# Patient Record
Sex: MALE | Race: WHITE
[De-identification: names, ages, dates, MRNs, and addresses within clinical notes are randomized per-mention and may not be internally consistent; named-entity substitution may affect disease eponyms.]

---

## 2018-05-08 ENCOUNTER — HOSPITAL ENCOUNTER (OUTPATIENT)
Dept: HOSPITAL 89 - US | Age: 58
End: 2018-05-08
Attending: FAMILY MEDICINE
Payer: COMMERCIAL

## 2018-05-08 DIAGNOSIS — E04.2: Primary | ICD-10-CM

## 2018-05-08 PROCEDURE — 76536 US EXAM OF HEAD AND NECK: CPT

## 2018-05-08 NOTE — RADIOLOGY IMAGING REPORT
FACILITY: [facility] 

 

PATIENT NAME: Luis Saucedo

: 1960

MR: [id number]

V: [id number]

EXAM DATE: 

ORDERING PHYSICIAN: CAIO DEL TORO

TECHNOLOGIST: 

 

Location: [facility]

Patient: Luis Saucedo

: 1960

MRN: [id number]

Visit/Account:[id number]

Date of Sevice:  2018

 

ACCESSION #: [id number]

 

THYROID

 

HISTORY:  Hypothyroidism

 

COMPARISON:  2013

 

FINDINGS:

 

SIZE: Normal.

Right lobe: 4.3 x 1.6 x 1.3 cm

Left lobe: 3.9 x 0.9 x 1.8 cm

Isthmus: 4 mm

 

PARENCHYMA: Heterogeneous bilaterally

 

NODULES:

Right lobe:

*  None discrete.

Left lobe:

*  There is a 2.5 mm cyst in the lower pole of the left lobe

Isthmus:

*  None discrete.

 

VASCULARITY: Within normal limits.

 

ADDITIONAL FINDINGS: None.

 

IMPRESSION:

Heterogeneous appearing thyroid bilaterally

 

2.5 mm cyst lower pole the left lobe

 

 

REFERENCE:

2015 American Thyroid Association Management Guidelines for Adult Patients with Thyroid Nodules and D
ifferentiated Thyroid Cancer: The American Thyroid Association Guidelines Task Force on Thyroid Nodul
es and Differentiated Thyroid Cancer.

 

SONOGRAPHIC PATTERNS:

*  Benign: Purely cystic nodules (no solid component); estimated risk of malignancy <1 percent; no bi
opsy recommended.

*  Very Low Suspicion: Spongiform or partially cystic nodules without any of the sonographic features
 described in low, intermediate, or high suspicion patterns; estimated risk of malignancy <3 percent;
 consider FNA at > 2 cm (Observation without FNA is also a reasonable option).

*  Low Suspicion: Isoechoic or hyperechoic solid nodule, or partially cystic nodule with eccentric so
lid areas, without microcalcification, irregular margin or ETE (extra-thyroidal extension), or taller
 than wide shape; estimated risk of malignancy 5-10 percent; recommend FNA at >1.5 cm.

*  Intermediate Suspicion: Hypoechoic solid nodule with smooth margins without microcalcifications, E
TE (extra-thyroidal extension), or taller than wide shape; estimated risk of malignancy 10-20 percent
; recommend FNA at > 1 cm.

*  High Suspicion: Solid hypoechoic nodule or solid hypoechoic component of a partially cystic nodule
 with one or more of the following features: irregular margins (infiltrative, microlobulated), microc
alcifications, taller than wide shape, rim calcifications with small extrusive soft tissue component,
 evidence of ETE (extra-thyroidal extension); estimated risk of malignancy >70-90 percent; recommend 
FNA at > 1 cm.

 

NOTES:

*  Although a sonographically suspicious subcentimeter thyroid nodule without evidence of extrathyroi
adan extension or sonographically suspicious lymph nodes may be observed with close sonographic follow
-up rather than pursuing immediate FNA, patient age and preference may modify decision-making.

A > 50% interval increase in nodule volume and/or development of new suspicious sonographic features 
are felt to be a valid reasons for potential re-aspiration of a nodule previously shown to have benig
n FNA cytology.

 

Report Dictated By: Margarette Patel MD at 2018 11:12 AM

 

Report E-Signed By: Margarette Patel MD  at 2018 11:13 AM

 

WSN:[id number]

## 2019-06-23 ENCOUNTER — HOSPITAL ENCOUNTER (EMERGENCY)
Dept: HOSPITAL 89 - ER | Age: 59
Discharge: HOME | End: 2019-06-23
Payer: COMMERCIAL

## 2019-06-23 VITALS — DIASTOLIC BLOOD PRESSURE: 80 MMHG | SYSTOLIC BLOOD PRESSURE: 121 MMHG

## 2019-06-23 DIAGNOSIS — R10.9: Primary | ICD-10-CM

## 2019-06-23 DIAGNOSIS — N28.1: ICD-10-CM

## 2019-06-23 LAB — PLATELET COUNT, AUTOMATED: 210 K/UL (ref 150–450)

## 2019-06-23 PROCEDURE — 84075 ASSAY ALKALINE PHOSPHATASE: CPT

## 2019-06-23 PROCEDURE — 82247 BILIRUBIN TOTAL: CPT

## 2019-06-23 PROCEDURE — 82435 ASSAY OF BLOOD CHLORIDE: CPT

## 2019-06-23 PROCEDURE — 85025 COMPLETE CBC W/AUTO DIFF WBC: CPT

## 2019-06-23 PROCEDURE — 84155 ASSAY OF PROTEIN SERUM: CPT

## 2019-06-23 PROCEDURE — 84295 ASSAY OF SERUM SODIUM: CPT

## 2019-06-23 PROCEDURE — 83690 ASSAY OF LIPASE: CPT

## 2019-06-23 PROCEDURE — 74177 CT ABD & PELVIS W/CONTRAST: CPT

## 2019-06-23 PROCEDURE — 84450 TRANSFERASE (AST) (SGOT): CPT

## 2019-06-23 PROCEDURE — 82374 ASSAY BLOOD CARBON DIOXIDE: CPT

## 2019-06-23 PROCEDURE — 82565 ASSAY OF CREATININE: CPT

## 2019-06-23 PROCEDURE — 82310 ASSAY OF CALCIUM: CPT

## 2019-06-23 PROCEDURE — 81001 URINALYSIS AUTO W/SCOPE: CPT

## 2019-06-23 PROCEDURE — 96375 TX/PRO/DX INJ NEW DRUG ADDON: CPT

## 2019-06-23 PROCEDURE — 96374 THER/PROPH/DIAG INJ IV PUSH: CPT

## 2019-06-23 PROCEDURE — 82040 ASSAY OF SERUM ALBUMIN: CPT

## 2019-06-23 PROCEDURE — 84520 ASSAY OF UREA NITROGEN: CPT

## 2019-06-23 PROCEDURE — 74018 RADEX ABDOMEN 1 VIEW: CPT

## 2019-06-23 PROCEDURE — 96361 HYDRATE IV INFUSION ADD-ON: CPT

## 2019-06-23 PROCEDURE — 84132 ASSAY OF SERUM POTASSIUM: CPT

## 2019-06-23 PROCEDURE — 99284 EMERGENCY DEPT VISIT MOD MDM: CPT

## 2019-06-23 PROCEDURE — 82947 ASSAY GLUCOSE BLOOD QUANT: CPT

## 2019-06-23 PROCEDURE — 84460 ALANINE AMINO (ALT) (SGPT): CPT

## 2019-06-23 NOTE — RADIOLOGY IMAGING REPORT
FACILITY: [facility] [place] 

 

PATIENT NAME: Luis Saucedo

: 1960

MR: [id number]

V: [id number]

EXAM DATE: 

ORDERING PHYSICIAN: AMY RHODES

TECHNOLOGIST: 

 

Location: [facility] [place]

Patient: Luis Saucedo

: 1960

MRN: [id number]

Visit/Account:[id number]

Date of Sevice:  2019

 

ACCESSION #: [id number]

 

CT abdomen and pelvis with IV contrast

 

Indication: Right flank pain.

 

Comparison:   10/14/2014..

 

Technique:   Axial CT images were obtained through the abdomen and pelvis during injection of nonioni
c iodinated intravenous contrast. Reformatted coronal and sagittal images were also obtained.

One of the following dose optimization techniques was utilized in the performance of this exam: Autom
ated exposure control; adjustment of the mA and/or kV according to the patient's size; or use of an i
terative  reconstruction technique.  Specific details can be referenced in the facility's radiology C
T exam operational policy.

Contrast:   75 ml of Isovue-370  IV contrast.

 

Findings:

Lower lung fields: Limited views lower lung field are unremarkable.

 

Liver: No focal parenchymal abnormality of the liver.

Biliary: Gallbladder appears unremarkable as well as the intra and extra hepatic biliary system.

Pancreas: No focal abnormality.

Spleen: Normal appearance.

Adrenal glands: Unremarkable.

Kidneys / retroperitoneum: No evidence of nephrolithiasis or hydronephrosis.  Right renal cysts are a
gain present.  The largest measuring 7 cm, previously 4.5 cm..  Similar to the previous examination. 
 No solid renal lesions.

 

 

Bowel / peritoneum / mesenteries: Colon shows no focal abnormality.  The appendix not visualized.  Th
ere are some surgical sutures in this region which may suggest appendectomy.  Small bowel shows no fo
lizz normality or obstruction.  The stomach is unremarkable.

No free air, free fluid, fluid collections or areas of inflammation.  Small umbilical hernia containi
ng fat.

 

Lymph node assessment: No pathologic adenopathy identified.

 

Pelvic  structures: Prostate is again mildly enlarged and heterogeneous.  Cause mild impression to 
the urinary bladder.  The remaining pelvic structures visualized within normal limits.

 

 

Vessels: No significant atherosclerotic calcifications seen throughout a nonaneurysmal abdominal aort
a and branches.

 

Musculoskeletal / Body wall: No acute or aggressive osseous abnormality.  Spine again shows degenerat
mitch changes.

 

 

 

IMPRESSION:

1. No acute intra-abdominal abnormality

2.  No indication of urinary stones or hydronephrosis.

3.  The right kidney shows renal cysts.  The largest measures 7 cm and previously 4.5 cm.  However st
ill simple cyst.

4.  Other chronic stable findings as above.

 

 

Report Dictated By: Nelson Moore at 2019 8:58 PM

 

Report E-Signed By: Nelson Moore  at 2019 9:08 PM

 

WSN:[id number]

## 2019-06-23 NOTE — RADIOLOGY IMAGING REPORT
FACILITY: [facility] [place] 

 

PATIENT NAME: Luis Saucedo

: 1960

MR: [id number]

V: [id number]

EXAM DATE: 

ORDERING PHYSICIAN: AMY RHODES

TECHNOLOGIST: 

 

Location: [facility]

Patient: Luis Saucedo

: 1960

MRN: [id number]

Visit/Account:[id number]

Date of Sevice:  2019

 

ACCESSION #: [id number]

 

KUB SINGLE VIEW ABDOMEN

 

INDICATION: Right-sided abdominal pain.

 

COMPARISON: None available

 

FINDINGS:   Spine view the abdomen.  Some stool seen in colon.  The bowel gas pattern is nonobstructe
d and nondilated.  Mildly prominent loops small bowel seen in left mid abdomen.  However no dilated l
oops of small bowel seen.  Abdominal soft tissues grossly normal without suspicious lucencies.  The r
ight mid abdomen does show a small calcific density along the psoas muscle and tiny calcific density 
in the right inferior pelvis.  Left pelvis does show a prominent vascular phlebolith.  No other suspi
cious calcifications.  Lung bases are clear.  No acute bony abnormality.

 

IMPRESSION: A small calcific density in the right mid abdomen right lower pelvis as above.  These may
 be benign such as a vascular phlebolith or debris in the alimentary tract.  However cannot exclude a
 urinary calcification.  If there is clinical concern or hematuria.  Follow-up noncontrast CT scan of
 the abdomen and pelvis can assess for urinary calcifications.

 

Report Dictated By: Nelson Moore at 2019 7:08 PM

 

Report E-Signed By: Nelson Moore  at 2019 7:12 PM

 

WSN:[id number]

## 2019-06-23 NOTE — ER REPORT
History and Physical


Time Seen By MD:  17:45


Hx. of Stated Complaint:  


THORACIC BACK PAIN SINCE FRIDAY. REPORTS INCREASED TYLENOL SINCE  (LORTAB, 


AND APPROX 5 500 MG TABLETS DAILY)


HPI/ROS


CHIEF COMPLAINT: Back and side pain





HISTORY OF PRESENT ILLNESS: This is a 59-year-old male who presents to the 


emergency department for right sided back pain. Patient states that he has 


intermittent low back pain, and also last week had a root canal, had some 


hydrocodone that he taken, he states that he was taken one hydrocodone 


2025 he was also taken 500 mg Tylenol tablets more frequently over the 


last couple of days due to the back and side pain. Patient became very nervous 


and anxious as he started reading about Tylenol toxicity and is concerned that 


perhaps he has Tylenol problems or something wrong with his gallbladder. Patient


states that the pain in the side and back began Friday, slow to progress. No 


fevers or chills. No chest pain or shortness of breath. No jaundice. No 


headaches or confusion.





REVIEW OF SYSTEMS:


Constitutional: No fever, no chills.


Eyes: No discharge.


ENT: No sore throat. 


Cardiovascular: No chest pain, no palpitations.


Respiratory: No cough, no shortness of breath.


Gastrointestinal: As above.


Genitourinary: No hematuria.


Musculoskeletal: As above.


Skin: No rashes.


Neurological: No headache.


Allergies:  


Coded Allergies:  


     No Known Drug Allergies (Verified , 14)


Home Meds


Active Scripts


Hydrocodone Bit/Acetaminophen (NORCO 5-325 TABLET) 1 Each Tablet, 1 EACH PO Q4-


6H PRN for PAIN, #10 TAB


   Prov:AMY RHODES FN-BC         19


Reported Medications


Levothyroxine Sodium (LEVOTHYROXINE SODIUM) 75 Mcg Tablet, 75 MCG PO QDAY, TAB


   19


Dexlansoprazole (DEXILANT) 30 Mg Cap.dr.mp, 30 MG PO DAILY


   19


Bupropion Hcl (WELLBUTRIN XL) 300 Mg Tab.er.24h, 300 MG PO QDAY, TAB


   TAKE 1 TABLET BY MOUTH EVERY DAY


   14


Discontinued Reported Medications


Aspirin (ASPIR 81) 81 Mg Tablet., 81 MG PO QDAY, TAB


   14


Famotidine (Pepcid Ac) 20 Mg Tablet, 20 MG PO PRN, 0 Refills


   12/17/10


Discontinued Scripts


Cyclobenzaprine Hcl (CYCLOBENZAPRINE HCL) 10 Mg Tablet, 10 MG PO TID, #20 TAB


   TAKE 1 TABLET BY MOUTH THREE TIMES A DAY


   Prov:SHUKRI VELASQUEZ DO         14


Hydrocodone Bit/Acetaminophen (HYDROCODON-ACETAMINOPHEN 5-325) 1 Each Tablet, 1 


EACH PO Q4-6H, #10


   TAKE ONE TABLET BY MOUTH


   EVERY 4-6 HOURS AS NEEDED


   FOR PAIN


   Prov:SHUKRI VELASQUEZ DO         14


Past Medical/Surgical History


The patient has a past medical and surgical history of right bundle branch 


block, bronchitis, pneumonia, GERD, wears glasses, vascular surgery, surface 


blood clot, depression, anxiety, appendectomy, ankle tendon repair.


Reviewed Nurses Notes:  Yes


Hx Smoking:  No


Smoking Status:  Never Smoker


Hx Substance Use Disorder:  No


Hx Alcohol Use:  No


Constitutional





Vital Sign - Last 24 Hours








 19





 17:15 17:30 18:00 18:30


 


Temp 97.2   


 


Pulse 74 68 74 71


 


Resp 20   


 


B/P (MAP) 151/93   131/87 (102)


 


Pulse Ox 95 92 93 92


 


O2 Delivery Room Air   


 


    





 19   





 19:00   


 


Pulse 64   


 


B/P (MAP) 118/83 (95)   


 


Pulse Ox 92   








Physical Exam


   General Appearance: The patient is alert, has no immediate need for airway 


protection and no signs of toxicity. 


Eyes: Pupils equal and round no pallor or injection. No icterus.


ENT, Mouth: Mucous membranes are moist.


Respiratory: There are no retractions, lungs are clear to auscultation.


Cardiovascular: Regular rate and rhythm. No murmurs, clicks or rubs.


Gastrointestinal:  Abdomen is soft and non tender, no masses, bowel sounds 


normal. Negative Manning sign.


Neurological: Alert and oriented sore. Moving. Following all commands. No focal 


neuro deficits.


Skin: Warm and dry, no rashes.


Musculoskeletal:  Neck is supple non tender.


      Extremities are nontender, nonswollen and have full range of motion.





DIFFERENTIAL DIAGNOSIS: After history and physical exam differential diagnosis 


was considered for back pain including but not limited to muscular pain, 


herniated disc, spine fracture, intra-abdominal causes and urinary tract 


infection.





Medical Decision Making


Data Points


Result Diagram:  


19 1720                                                                    


           6/23/19 1720





Laboratory





Hematology








Test


 19


17:09 19


17:20


 


Urine Color Colorless  


 


Urine Clarity Clear  


 


Urine pH


 8.0 pH


(4.8-9.5) 





 


Urine Specific Gravity 1.002  


 


Urine Protein


 Negative mg/dL


(NEGATIVE) 





 


Urine Glucose (UA)


 Negative mg/dL


(NEGATIVE) 





 


Urine Ketones


 Negative mg/dL


(NEGATIVE) 





 


Urine Blood


 Negative


(NEGATIVE) 





 


Urine Nitrite


 Negative


(NEGATIVE) 





 


Urine Bilirubin


 Negative


(NEGATIVE) 





 


Urine Urobilinogen


 Negative mg/dL


(0.2-1.9) 





 


Urine Leukocyte Esterase


 Negative


(NEGATIVE) 





 


Urine RBC


 None /HPF


(0-2/HPF) 





 


Urine WBC


 <1 /HPF


(0-5/HPF) 





 


Urine Squamous Epithelial


Cells None /LPF


(</=FEW) 





 


Urine Bacteria


 Negative /HPF


(NONE-FEW) 





 


Urine Mucus


 None /HPF


(NONE-FEW) 





 


Red Blood Count


 


 5.24 M/uL


(4.00-5.60)


 


Mean Corpuscular Volume


 


 92.4 fL


(80.0-96.0)


 


Mean Corpuscular Hemoglobin


 


 31.0 pg


(26.0-33.0)


 


Mean Corpuscular Hemoglobin


Concent 


 33.5 g/dL


(32.0-36.0)


 


Red Cell Distribution Width


 


 13.8 %


(11.5-14.5)


 


Mean Platelet Volume


 


 8.1 fL


(7.2-11.1)


 


Neutrophils (%) (Auto)


 


 73.7 %


(39.4-72.5)


 


Lymphocytes (%) (Auto)


 


 17.6 %


(17.6-49.6)


 


Monocytes (%) (Auto)


 


 7.1 %


(4.1-12.4)


 


Eosinophils (%) (Auto)


 


 0.6 %


(0.4-6.7)


 


Basophils (%) (Auto)


 


 1.0 %


(0.3-1.4)


 


Nucleated RBC Relative Count


(auto) 


 0.1 /100WBC 





 


Neutrophils # (Auto)


 


 4.2 K/uL


(2.0-7.4)


 


Lymphocytes # (Auto)


 


 1.0 K/uL


(1.3-3.6)


 


Monocytes # (Auto)


 


 0.4 K/uL


(0.3-1.0)


 


Eosinophils # (Auto)


 


 0.0 K/uL


(0.0-0.5)


 


Basophils # (Auto)


 


 0.1 K/uL


(0.0-0.1)


 


Nucleated RBC Absolute Count


(auto) 


 0.01 K/uL 





 


Sodium Level


 


 141 mmol/L


(137-145)


 


Potassium Level


 


 3.7 mmol/L


(3.5-5.0)


 


Chloride Level


 


 102 mmol/L


()


 


Carbon Dioxide Level


 


 28 mmol/L


(22-30)


 


Blood Urea Nitrogen


 


 12 mg/dl


(9-21)


 


Creatinine


 


 1.20 mg/dl


(0.66-1.25)


 


Glomerular Filtration Rate


Calc 


 > 60.0 





 


Random Glucose


 


 104 mg/dl


()


 


Calcium Level


 


 8.8 mg/dl


(8.4-10.2)


 


Total Bilirubin


 


 0.4 mg/dl


(0.2-1.3)


 


Aspartate Amino Transf


(AST/SGOT) 


 25 U/L (0-35) 





 


Alanine Aminotransferase


(ALT/SGPT) 


 36 U/L (0-56) 





 


Alkaline Phosphatase  70 U/L (0-126) 


 


Total Protein


 


 7.1 g/dl


(6.3-8.2)


 


Albumin


 


 4.3 g/dl


(3.5-5.0)


 


Lipase


 


 48 U/L


()








Chemistry








Test


 19


17:09 19


17:20


 


Urine Color Colorless  


 


Urine Clarity Clear  


 


Urine pH


 8.0 pH


(4.8-9.5) 





 


Urine Specific Gravity 1.002  


 


Urine Protein


 Negative mg/dL


(NEGATIVE) 





 


Urine Glucose (UA)


 Negative mg/dL


(NEGATIVE) 





 


Urine Ketones


 Negative mg/dL


(NEGATIVE) 





 


Urine Blood


 Negative


(NEGATIVE) 





 


Urine Nitrite


 Negative


(NEGATIVE) 





 


Urine Bilirubin


 Negative


(NEGATIVE) 





 


Urine Urobilinogen


 Negative mg/dL


(0.2-1.9) 





 


Urine Leukocyte Esterase


 Negative


(NEGATIVE) 





 


Urine RBC


 None /HPF


(0-2/HPF) 





 


Urine WBC


 <1 /HPF


(0-5/HPF) 





 


Urine Squamous Epithelial


Cells None /LPF


(</=FEW) 





 


Urine Bacteria


 Negative /HPF


(NONE-FEW) 





 


Urine Mucus


 None /HPF


(NONE-FEW) 





 


White Blood Count


 


 5.7 k/uL


(4.5-11.0)


 


Red Blood Count


 


 5.24 M/uL


(4.00-5.60)


 


Hemoglobin


 


 16.2 g/dL


(14.0-18.0)


 


Hematocrit


 


 48.4 %


(42.0-52.0)


 


Mean Corpuscular Volume


 


 92.4 fL


(80.0-96.0)


 


Mean Corpuscular Hemoglobin


 


 31.0 pg


(26.0-33.0)


 


Mean Corpuscular Hemoglobin


Concent 


 33.5 g/dL


(32.0-36.0)


 


Red Cell Distribution Width


 


 13.8 %


(11.5-14.5)


 


Platelet Count


 


 210 K/uL


(150-450)


 


Mean Platelet Volume


 


 8.1 fL


(7.2-11.1)


 


Neutrophils (%) (Auto)


 


 73.7 %


(39.4-72.5)


 


Lymphocytes (%) (Auto)


 


 17.6 %


(17.6-49.6)


 


Monocytes (%) (Auto)


 


 7.1 %


(4.1-12.4)


 


Eosinophils (%) (Auto)


 


 0.6 %


(0.4-6.7)


 


Basophils (%) (Auto)


 


 1.0 %


(0.3-1.4)


 


Nucleated RBC Relative Count


(auto) 


 0.1 /100WBC 





 


Neutrophils # (Auto)


 


 4.2 K/uL


(2.0-7.4)


 


Lymphocytes # (Auto)


 


 1.0 K/uL


(1.3-3.6)


 


Monocytes # (Auto)


 


 0.4 K/uL


(0.3-1.0)


 


Eosinophils # (Auto)


 


 0.0 K/uL


(0.0-0.5)


 


Basophils # (Auto)


 


 0.1 K/uL


(0.0-0.1)


 


Nucleated RBC Absolute Count


(auto) 


 0.01 K/uL 





 


Glomerular Filtration Rate


Calc 


 > 60.0 





 


Calcium Level


 


 8.8 mg/dl


(8.4-10.2)


 


Total Bilirubin


 


 0.4 mg/dl


(0.2-1.3)


 


Aspartate Amino Transf


(AST/SGOT) 


 25 U/L (0-35) 





 


Alanine Aminotransferase


(ALT/SGPT) 


 36 U/L (0-56) 





 


Alkaline Phosphatase  70 U/L (0-126) 


 


Total Protein


 


 7.1 g/dl


(6.3-8.2)


 


Albumin


 


 4.3 g/dl


(3.5-5.0)


 


Lipase


 


 48 U/L


()








Urinalysis








Test


 19


17:09


 


Urine Color Colorless 


 


Urine Clarity Clear 


 


Urine pH


 8.0 pH


(4.8-9.5)


 


Urine Specific Gravity 1.002 


 


Urine Protein


 Negative mg/dL


(NEGATIVE)


 


Urine Glucose (UA)


 Negative mg/dL


(NEGATIVE)


 


Urine Ketones


 Negative mg/dL


(NEGATIVE)


 


Urine Blood


 Negative


(NEGATIVE)


 


Urine Nitrite


 Negative


(NEGATIVE)


 


Urine Bilirubin


 Negative


(NEGATIVE)


 


Urine Urobilinogen


 Negative mg/dL


(0.2-1.9)


 


Urine Leukocyte Esterase


 Negative


(NEGATIVE)


 


Urine RBC


 None /HPF


(0-2/HPF)


 


Urine WBC


 <1 /HPF


(0-5/HPF)


 


Urine Squamous Epithelial


Cells None /LPF


(</=FEW)


 


Urine Bacteria


 Negative /HPF


(NONE-FEW)


 


Urine Mucus


 None /HPF


(NONE-FEW)











EKG/Imaging


Imaging


PATIENT NAME: Luis Saucedo


: 1960


MR: [id number]


V: [id number]


EXAM DATE: 


ORDERING PHYSICIAN: AMY RHODES


TECHNOLOGIST: 


 


Location: [facility]


Patient: Luis Saucedo


: 1960


MRN: [id number]


Visit/Account:[id number]


Date of Sevice:  2019


 


ACCESSION #: [id number]


 


CT abdomen and pelvis with IV contrast


 


Indication: Right flank pain.


 


Comparison:   10/14/2014..


 


Technique:   Axial CT images were obtained through the abdomen and pelvis during


injection of nonionic iodinated intravenous contrast. Reformatted coronal and 


sagittal images were also obtained.


One of the following dose optimization techniques was utilized in the 


performance of this exam: Automated exposure control; adjustment of the mA 


and/or kV according to the patient's size; or use of an iterative  


reconstruction technique.  Specific details can be referenced in the facility's 


radiology CT exam operational policy.


Contrast:   75 ml of Isovue-370  IV contrast.


 


Findings:


Lower lung fields: Limited views lower lung field are unremarkable.


 


Liver: No focal parenchymal abnormality of the liver.


Biliary: Gallbladder appears unremarkable as well as the intra and extra hepatic


biliary system.


Pancreas: No focal abnormality.


Spleen: Normal appearance.


Adrenal glands: Unremarkable.


Kidneys / retroperitoneum: No evidence of nephrolithiasis or hydronephrosis.  


Right renal cysts are again present.  The largest measuring 7 cm, previously 4.5


cm..  Similar to the previous examination.  No solid renal lesions.


 


 


Bowel / peritoneum / mesenteries: Colon shows no focal abnormality.  The 


appendix not visualized.  There are some surgical sutures in this region which 


may suggest appendectomy.  Small bowel shows no focal normality or obstruction. 


The stomach is unremarkable.


No free air, free fluid, fluid collections or areas of inflammation.  Small umb


ilical hernia containing fat.


 


Lymph node assessment: No pathologic adenopathy identified.


 


Pelvic  structures: Prostate is again mildly enlarged and heterogeneous.  


Cause mild impression to the urinary bladder.  The remaining pelvic structures 


visualized within normal limits.


 


 


Vessels: No significant atherosclerotic calcifications seen throughout a 


nonaneurysmal abdominal aorta and branches.


 


Musculoskeletal / Body wall: No acute or aggressive osseous abnormality.  Spine 


again shows degenerative changes.


 


 


 


IMPRESSION:


1. No acute intra-abdominal abnormality


2.  No indication of urinary stones or hydronephrosis.


3.  The right kidney shows renal cysts.  The largest measures 7 cm and 


previously 4.5 cm.  However still simple cyst.


4.  Other chronic stable findings as above.


 


 


Report Dictated By: Nelson Moore at 2019 8:58 PM


 


Report E-Signed By: Nelson Moore  at 2019 9:08 PM


 


WSN:[id number]





PATIENT NAME: Luis Saucedo


: 1960


MR: [id number]


V: [id number]


EXAM DATE: [id number]


ORDERING PHYSICIAN: MAY RHODES


TECHNOLOGIST: 


 


Location: [facility]


Patient: Luis Saucedo


: 1960


MRN: [id number]


Visit/Account:[id number]


Date of Sevice:  2019


 


ACCESSION #: [id number]


 


KUB SINGLE VIEW ABDOMEN


 


INDICATION: Right-sided abdominal pain.


 


COMPARISON: None available


 


FINDINGS:   Spine view the abdomen.  Some stool seen in colon.  The bowel gas 


pattern is nonobstructed and nondilated.  Mildly prominent loops small bowel 


seen in left mid abdomen.  However no dilated loops of small bowel seen.  


Abdominal soft tissues grossly normal without suspicious lucencies.  The right 


mid abdomen does show a small calcific density along the psoas muscle and tiny 


calcific density in the right inferior pelvis.  Left pelvis does show a 


prominent vascular phlebolith.  No other suspicious calcifications.  Lung bases 


are clear.  No acute bony abnormality.


 


IMPRESSION: A small calcific density in the right mid abdomen right lower pelvis


as above.  These may be benign such as a vascular phlebolith or debris in the 


alimentary tract.  However cannot exclude a urinary calcification.  If there is 


clinical concern or hematuria.  Follow-up noncontrast CT scan of the abdomen and


pelvis can assess for urinary calcifications.


 


Report Dictated By: Nelson Moore at 2019 7:08 PM


 


Report E-Signed By: Nelson Moore  at 2019 7:12 PM


 


WSN:[id number]





ED Course/Re-evaluation


Clinical Indication for ER IV:  Hydration, IV Access


ED Course


The patient was admitted to room. A history and physical obtained. Differential 


diagnoses were considered. IV was started. A CBC, CMP, lipase were obtained. A 1


L normal saline bolus was given. 4 mg IV Zofran, 4 mg IV morphine. Patient 


states the pain has improved, he does have a more localized area of pain on his 


right flank area. No renal colic, patient does have an uncompensated cysts on 


the right kidney, I did review the results with the patient. Patient was given 


hydrocodone to go home with, associated with a prescription for hydrocodone, I 


did tell him he should follow up with urology for reevaluation, follow-up with 


his primary care provider for reevaluation as well. He expressed understanding, 


was agreeable with this plan care and discharged home.











 2019 8:56:56 pm he did stop into see the patient was doing, he continues 


to decline pain medications at this time, I did tell him I am waiting for the CT


report. Patient expressed understanding. He denied any other needs at this time.


Decision to Disposition Date:  2019


Decision to Disposition Time:  21:19





Depart


Departure


Latest Vital Signs





Vital Signs








  Date Time  Temp Pulse Resp B/P (MAP) Pulse Ox O2 Delivery O2 Flow Rate FiO2


 


19 19:00  64  118/83 (95) 92   


 


19 17:15 97.2  20   Room Air  








Impression:  


   Primary Impression:  


   Acute right flank pain


   Additional Impression:  


   Renal cyst, right


Condition:  Improved


Disposition:  HOME OR SELF-CARE


New Scripts


Hydrocodone Bit/Acetaminophen (NORCO 5-325 TABLET) 1 Each Tablet


1 EACH PO Q4-6H PRN for PAIN, #10 TAB


   Prov: AMY RHODES ALINA FNP-BC         19


Patient Instructions:  Flank Pain (ED)





Additional Instructions:  


No concerning findings on her laboratory studies today.


The pain which are experiencing could be secondary to the cysts on your kidney, 


you could have aggravated them this last week when you were mowing your lawn.


Please follow-up with your urologist when you return from [place].


Be sure to drink plenty of water.


You can take 600-800 mg of ibuprofen every 6-8 hours as needed for pain.


You can take 500-1000 mg of Tylenol every 8 hours as needed for pain.


You can take one hydrocodone every 4-6 hours as needed for severe pain, due to 


be aware of the to should not combine one code on with Tylenol.


Return to the emergency department for any acute concerns or worsening symptoms 


per


Follow-up with your primary care provider when he returned from [place] as 


well.





Problem Qualifiers











AMY RHODES FNP-BC      2019 17:53